# Patient Record
Sex: MALE | Race: WHITE
[De-identification: names, ages, dates, MRNs, and addresses within clinical notes are randomized per-mention and may not be internally consistent; named-entity substitution may affect disease eponyms.]

---

## 2019-07-16 ENCOUNTER — HOSPITAL ENCOUNTER (EMERGENCY)
Dept: HOSPITAL 94 - ER | Age: 29
Discharge: ANOTHER HOSPITAL | End: 2019-07-16
Payer: MEDICAID

## 2019-07-16 VITALS — HEIGHT: 69 IN | BODY MASS INDEX: 18.38 KG/M2 | WEIGHT: 124.12 LBS

## 2019-07-16 VITALS — SYSTOLIC BLOOD PRESSURE: 145 MMHG | DIASTOLIC BLOOD PRESSURE: 77 MMHG
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
## 2019-07-16 DIAGNOSIS — F12.90: ICD-10-CM

## 2019-07-16 DIAGNOSIS — F17.200: ICD-10-CM
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
## 2019-07-16 DIAGNOSIS — F10.920: ICD-10-CM

## 2019-07-16 DIAGNOSIS — S06.5X0A: Primary | ICD-10-CM
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
## 2019-07-16 PROCEDURE — 80320 DRUG SCREEN QUANTALCOHOLS: CPT

## 2019-07-16 PROCEDURE — 99285 EMERGENCY DEPT VISIT HI MDM: CPT

## 2019-07-16 PROCEDURE — 70450 CT HEAD/BRAIN W/O DYE: CPT

## 2019-07-16 PROCEDURE — 36415 COLL VENOUS BLD VENIPUNCTURE: CPT

## 2019-07-16 NOTE — NUR
pt upset that he didn't get "real tylenol" when asked what real tylenol is he 
states "the pain medication" - when educated that tylenol is a pain medication 
he states "but it didn't help, I need Tylenol #3"

## 2019-07-16 NOTE — NUR
MEDICAL RELEASE TO [facility] SIGNED BOTH RELEASES FAXED TO FACILITY. SPOKE WITH MAGALIS LAMAR AT [facility] WHO SAID HE FAXED MEDICAL RECORDS TO A FACILITY IN [place], [place] LAST NIGHT. Beck Lamar WAS NOT FAMILAR WITH [facility] NAME OF THE FACILITY, BUT 
WAS CLEAR HE SENT RECORDS

## 2019-07-16 NOTE — NUR
DR BENJAMIN TO SEE PT . PATIENT WAS EDUCATED ABOUT THE POC AND HOW IT IS NOT 
SAFE TO WALK OUT OF FACILITY WITH ETOH ON BOARD AND A CT RESULT THAT NEEDS TO 
BE REVIEWED. ENCOURAGED PATIENT TO REMAIN HERE AT [facility] TO BE TRANSPORTED TO 
[facility] FOR A NEURO EVALUATION IN REGARDS TO CT SCAN.